# Patient Record
Sex: MALE | Race: WHITE | NOT HISPANIC OR LATINO | Employment: FULL TIME | URBAN - METROPOLITAN AREA
[De-identification: names, ages, dates, MRNs, and addresses within clinical notes are randomized per-mention and may not be internally consistent; named-entity substitution may affect disease eponyms.]

---

## 2023-12-12 ENCOUNTER — OFFICE VISIT (OUTPATIENT)
Dept: OPHTHALMOLOGY | Facility: CLINIC | Age: 68
End: 2023-12-12
Payer: COMMERCIAL

## 2023-12-12 DIAGNOSIS — H43.22 ASTEROID HYALOSIS OF LEFT EYE: ICD-10-CM

## 2023-12-12 DIAGNOSIS — H52.4 REGULAR ASTIGMATISM WITH PRESBYOPIA, BILATERAL: Primary | ICD-10-CM

## 2023-12-12 DIAGNOSIS — H25.13 AGE-RELATED NUCLEAR CATARACT OF BOTH EYES: ICD-10-CM

## 2023-12-12 DIAGNOSIS — H35.363 DRUSEN OF MACULA OF BOTH EYES: ICD-10-CM

## 2023-12-12 DIAGNOSIS — H35.3231 EXUDATIVE AGE-RELATED MACULAR DEGENERATION OF BOTH EYES WITH ACTIVE CHOROIDAL NEOVASCULARIZATION (MULTI): ICD-10-CM

## 2023-12-12 DIAGNOSIS — H52.223 REGULAR ASTIGMATISM WITH PRESBYOPIA, BILATERAL: Primary | ICD-10-CM

## 2023-12-12 PROCEDURE — 92014 COMPRE OPH EXAM EST PT 1/>: CPT | Performed by: STUDENT IN AN ORGANIZED HEALTH CARE EDUCATION/TRAINING PROGRAM

## 2023-12-12 PROCEDURE — 92015 DETERMINE REFRACTIVE STATE: CPT | Performed by: STUDENT IN AN ORGANIZED HEALTH CARE EDUCATION/TRAINING PROGRAM

## 2023-12-12 PROCEDURE — 92134 CPTRZ OPH DX IMG PST SGM RTA: CPT | Mod: BILATERAL PROCEDURE | Performed by: STUDENT IN AN ORGANIZED HEALTH CARE EDUCATION/TRAINING PROGRAM

## 2023-12-12 ASSESSMENT — REFRACTION_MANIFEST
OS_ADD: +2.50
OD_AXIS: 179
OD_SPHERE: +0.25
OS_AXIS: 165
OD_AXIS: 015
OD_CYLINDER: +0.25
OS_CYLINDER: +1.00
OD_SPHERE: PLANO
OD_CYLINDER: +0.75
METHOD_AUTOREFRACTION: 1
OS_SPHERE: UNABLE
OD_ADD: +2.50
OS_SPHERE: -0.50

## 2023-12-12 ASSESSMENT — TONOMETRY
OD_IOP_MMHG: 18
IOP_METHOD: GOLDMANN APPLANATION
OS_IOP_MMHG: 16

## 2023-12-12 ASSESSMENT — ENCOUNTER SYMPTOMS
ENDOCRINE NEGATIVE: 0
CONSTITUTIONAL NEGATIVE: 0
GASTROINTESTINAL NEGATIVE: 0
MUSCULOSKELETAL NEGATIVE: 0
PSYCHIATRIC NEGATIVE: 0
CARDIOVASCULAR NEGATIVE: 0
ALLERGIC/IMMUNOLOGIC NEGATIVE: 0
HEMATOLOGIC/LYMPHATIC NEGATIVE: 0
RESPIRATORY NEGATIVE: 0
EYES NEGATIVE: 0
NEUROLOGICAL NEGATIVE: 0

## 2023-12-12 ASSESSMENT — REFRACTION_WEARINGRX
OS_SPHERE: PLANO
OD_AXIS: 175
OD_CYLINDER: +0.50
OS_AXIS: 160
OD_SPHERE: -0.25
OS_CYLINDER: +0.50

## 2023-12-12 ASSESSMENT — VISUAL ACUITY
OD_SC+: +1
OS_SC+: -2
OS_SC: 20/40
CORRECTION_TYPE: GLASSES
OD_SC: 20/60
METHOD: SNELLEN - LINEAR

## 2023-12-12 ASSESSMENT — CONF VISUAL FIELD
METHOD: COUNTING FINGERS
OS_SUPERIOR_TEMPORAL_RESTRICTION: 0
OD_INFERIOR_TEMPORAL_RESTRICTION: 0
OS_INFERIOR_TEMPORAL_RESTRICTION: 0
OD_SUPERIOR_NASAL_RESTRICTION: 0
OS_SUPERIOR_NASAL_RESTRICTION: 0
OS_NORMAL: 1
OS_INFERIOR_NASAL_RESTRICTION: 0
OD_SUPERIOR_TEMPORAL_RESTRICTION: 0
OD_INFERIOR_NASAL_RESTRICTION: 0
OD_NORMAL: 1

## 2023-12-12 ASSESSMENT — CUP TO DISC RATIO
OS_RATIO: .40
OD_RATIO: .40

## 2023-12-12 ASSESSMENT — EXTERNAL EXAM - LEFT EYE: OS_EXAM: NORMAL

## 2023-12-12 ASSESSMENT — EXTERNAL EXAM - RIGHT EYE: OD_EXAM: NORMAL

## 2023-12-12 NOTE — PROGRESS NOTES
Assessment/Plan   Diagnoses and all orders for this visit:  Exudative age-related macular degeneration of both eyes with active choroidal neovascularization (CMS/HCC)  Drusen of macula of both eyes  -BCVA 20/40-20/50 OD+OS  -on OCT MAC large areas of SRD c few spots of subretinal fluid (SRF) noted OU accounting for reduction in BCVA  -Most likely etiology active choroidal neovascular membrane (CNVM) secondary to AMD however also considering possible bilateral longstanding Central Serous  -discussed with patient  -referral made for retina eval and TXT 4-6 weeks  Regular astigmatism with presbyopia, bilateral  -hold on spec RX at this time  Age-related nuclear cataract of both eyes  -non visually significant  Asteroid hyalosis of left eye  -dense OS making some views posterior difficult    RTC retina for eval and TXT active choroidal neovascular membrane (CNVM) vs Central Serous OU 4-6 weeks

## 2024-01-10 ENCOUNTER — OFFICE VISIT (OUTPATIENT)
Dept: OPHTHALMOLOGY | Facility: CLINIC | Age: 69
End: 2024-01-10
Payer: COMMERCIAL

## 2024-01-10 DIAGNOSIS — H35.363 DRUSEN OF MACULA OF BOTH EYES: ICD-10-CM

## 2024-01-10 DIAGNOSIS — H35.3231 EXUDATIVE AGE-RELATED MACULAR DEGENERATION OF BOTH EYES WITH ACTIVE CHOROIDAL NEOVASCULARIZATION (MULTI): Primary | ICD-10-CM

## 2024-01-10 PROCEDURE — 99214 OFFICE O/P EST MOD 30 MIN: CPT

## 2024-01-10 ASSESSMENT — TONOMETRY
OS_IOP_MMHG: 16
IOP_METHOD: GOLDMANN APPLANATION
OD_IOP_MMHG: 18

## 2024-01-10 ASSESSMENT — ENCOUNTER SYMPTOMS
EYES NEGATIVE: 1
NEUROLOGICAL NEGATIVE: 0
CONSTITUTIONAL NEGATIVE: 0

## 2024-01-10 ASSESSMENT — VISUAL ACUITY
OD_SC: 20/60
OS_SC: 20/40
METHOD: SNELLEN - LINEAR

## 2024-01-10 ASSESSMENT — EXTERNAL EXAM - LEFT EYE: OS_EXAM: NORMAL

## 2024-01-10 ASSESSMENT — EXTERNAL EXAM - RIGHT EYE: OD_EXAM: NORMAL

## 2024-01-10 ASSESSMENT — CUP TO DISC RATIO
OS_RATIO: .40
OD_RATIO: .40

## 2024-01-10 NOTE — PROGRESS NOTES
Serous retinal detachment, both eyes  - Multiple pockets of SRF with underlying RPE thickening and low lying PEDs, Right eye  - Subfoveal SRF with underlying RPE thickening and low lying PEDs, left eye  -BCVA 20/40-20/50 OD+OS    No Hx of steroid intake or MEK inhibitors or recent drug use  No Hx of kidney disease  No Hx of cancer or vasculopathy      OCT 01/10/24     - Right eye (OD): Multiple pockets of SRF with underlying RPE thickening and low lying PEDs. Choroid may show pachy vessels. No IRF     - Left eye (OS): subfoveal SRF with underlying RPE thickening and low lying PEDs. No IRF     OCTA  - Both eyes: PED may show flow (OS>OD) however, its not definite OU     Impression  - Ddx may include CSCR, Paraneoplastic, MNV 2ry to AMD or other causes.  - RTC for optos, FAF and IVFA; transit OD    Regular astigmatism with presbyopia, bilateral  -hold on spec RX at this time  Age-related nuclear cataract of both eyes  -non visually significant  Asteroid hyalosis of left eye  -dense OS making some views posterior difficult

## 2024-01-26 ENCOUNTER — APPOINTMENT (OUTPATIENT)
Dept: OPHTHALMOLOGY | Facility: CLINIC | Age: 69
End: 2024-01-26
Payer: MEDICARE

## 2024-02-02 ENCOUNTER — APPOINTMENT (OUTPATIENT)
Dept: OPHTHALMOLOGY | Facility: CLINIC | Age: 69
End: 2024-02-02
Payer: MEDICARE

## 2024-02-19 ENCOUNTER — OFFICE VISIT (OUTPATIENT)
Dept: OPHTHALMOLOGY | Facility: CLINIC | Age: 69
End: 2024-02-19
Payer: MEDICARE

## 2024-02-19 DIAGNOSIS — H35.3231 EXUDATIVE AGE-RELATED MACULAR DEGENERATION OF BOTH EYES WITH ACTIVE CHOROIDAL NEOVASCULARIZATION (MULTI): ICD-10-CM

## 2024-02-19 DIAGNOSIS — H35.713 CENTRAL SEROUS CHORIORETINOPATHY OF BOTH EYES: Primary | ICD-10-CM

## 2024-02-19 PROCEDURE — 92134 CPTRZ OPH DX IMG PST SGM RTA: CPT | Mod: BILATERAL PROCEDURE

## 2024-02-19 PROCEDURE — 99213 OFFICE O/P EST LOW 20 MIN: CPT

## 2024-02-19 PROCEDURE — 92235 FLUORESCEIN ANGRPH MLTIFRAME: CPT | Mod: BILATERAL PROCEDURE

## 2024-02-19 RX ORDER — FLUORESCEIN 500 MG/ML
500 INJECTION INTRAVENOUS
Status: COMPLETED | OUTPATIENT
Start: 2024-02-19 | End: 2024-02-19

## 2024-02-19 RX ADMIN — FLUORESCEIN 500 MG: 500 INJECTION INTRAVENOUS at 13:13

## 2024-02-19 ASSESSMENT — CONF VISUAL FIELD
OS_NORMAL: 1
OS_INFERIOR_NASAL_RESTRICTION: 0
OD_SUPERIOR_TEMPORAL_RESTRICTION: 0
OS_SUPERIOR_NASAL_RESTRICTION: 0
OS_SUPERIOR_TEMPORAL_RESTRICTION: 0
OD_INFERIOR_TEMPORAL_RESTRICTION: 0
OD_SUPERIOR_NASAL_RESTRICTION: 0
OD_INFERIOR_NASAL_RESTRICTION: 0
OD_NORMAL: 1
OS_INFERIOR_TEMPORAL_RESTRICTION: 0

## 2024-02-19 ASSESSMENT — ENCOUNTER SYMPTOMS
CONSTITUTIONAL NEGATIVE: 0
RESPIRATORY NEGATIVE: 0
ALLERGIC/IMMUNOLOGIC NEGATIVE: 0
PSYCHIATRIC NEGATIVE: 0
EYES NEGATIVE: 0
GASTROINTESTINAL NEGATIVE: 0
HEMATOLOGIC/LYMPHATIC NEGATIVE: 0
NEUROLOGICAL NEGATIVE: 0
MUSCULOSKELETAL NEGATIVE: 0
CARDIOVASCULAR NEGATIVE: 0
ENDOCRINE NEGATIVE: 0

## 2024-02-19 ASSESSMENT — VISUAL ACUITY
METHOD: SNELLEN - LINEAR
OD_CC+: -1
OS_CC: 20/30
CORRECTION_TYPE: GLASSES
OD_CC: 20/50

## 2024-02-19 ASSESSMENT — CUP TO DISC RATIO
OS_RATIO: .40
OD_RATIO: .40

## 2024-02-19 ASSESSMENT — EXTERNAL EXAM - RIGHT EYE: OD_EXAM: NORMAL

## 2024-02-19 ASSESSMENT — TONOMETRY
IOP_METHOD: TONOPEN
OD_IOP_MMHG: 13
OS_IOP_MMHG: 17

## 2024-02-19 ASSESSMENT — EXTERNAL EXAM - LEFT EYE: OS_EXAM: NORMAL

## 2024-02-19 NOTE — PROGRESS NOTES
Serous retinal detachment, both eyes  - Multiple pockets of SRF with underlying RPE thickening and low lying PEDs, Right eye  - Subfoveal SRF with underlying RPE thickening and low lying PEDs, left eye  -BCVA 20/40-20/50 OD+OS    No Hx of steroid intake or MEK inhibitors or recent drug use  No Hx of kidney disease  No Hx of cancer or vasculopathy    OCT 02/19/24     - Right eye (OD): Multiple pockets of SRF with underlying RPE thickening and low lying PEDs. Temporal subretinal fluid (SRF) improved slightly comapred to prior scan. Choroid may show pachy vessels. No IRF     - Left eye (OS): subfoveal subretinal fluid (SRF) with underlying RPE thickening improved from prior and low lying PEDs. No IRF     OCTA Prior  - Both eyes: PED may show flow (OS>OD) however, its not definite OU     Fluorescein angiography (FA) 02/19/24  OD. Early windows defect in the macula nasal to optic disc with late staining/pooling   OS: Window defect in the temporal macula and the periphery.     Impression  - Ddx may include CSCR, Paraneoplastic, MNV 2ry to AMD or other causes.  - Genetic testing was done today  - RTC in 2 months or sooner PRN    Regular astigmatism with presbyopia, bilateral  -hold on spec RX at this time  Age-related nuclear cataract of both eyes  -non visually significant  Asteroid hyalosis of left eye  -dense OS making some views posterior difficult

## 2024-04-10 ENCOUNTER — APPOINTMENT (OUTPATIENT)
Dept: OPHTHALMOLOGY | Facility: CLINIC | Age: 69
End: 2024-04-10
Payer: MEDICARE